# Patient Record
Sex: MALE | Race: WHITE | NOT HISPANIC OR LATINO | Employment: FULL TIME | URBAN - METROPOLITAN AREA
[De-identification: names, ages, dates, MRNs, and addresses within clinical notes are randomized per-mention and may not be internally consistent; named-entity substitution may affect disease eponyms.]

---

## 2019-07-29 ENCOUNTER — TELEPHONE (OUTPATIENT)
Dept: ORTHOPEDICS | Facility: CLINIC | Age: 59
End: 2019-07-29

## 2019-07-29 NOTE — TELEPHONE ENCOUNTER
----- Message from Carlota Olmos sent at 7/26/2019  8:09 AM CDT -----  Contact: Leigh (spouse)  Leigh (spouse) called asking for you to call her back she has some questions    [phone]

## 2019-07-29 NOTE — TELEPHONE ENCOUNTER
7/26/19  Spoke w/ patient's wife.   Patient wants to continue to see Dr. Tran.  Will make appointment.

## 2019-08-01 RX ORDER — FLUTICASONE PROPIONATE 50 MCG
SPRAY, SUSPENSION (ML) NASAL
COMMUNITY
End: 2019-08-16

## 2019-08-01 RX ORDER — FLUORIDE (SODIUM) 1.1 %
PASTE (ML) DENTAL
COMMUNITY
End: 2019-08-16

## 2019-08-01 RX ORDER — CLOBETASOL PROPIONATE 0.5 MG/G
OINTMENT TOPICAL
COMMUNITY
End: 2019-08-16

## 2019-08-01 RX ORDER — MELOXICAM 7.5 MG/1
TABLET ORAL
COMMUNITY
End: 2019-08-16

## 2019-08-01 RX ORDER — MELOXICAM 15 MG/1
15 TABLET ORAL DAILY
Refills: 5 | COMMUNITY
Start: 2019-06-24

## 2019-08-01 RX ORDER — AMOXICILLIN 875 MG/1
TABLET, FILM COATED ORAL
COMMUNITY
End: 2019-08-16

## 2019-08-02 ENCOUNTER — OFFICE VISIT (OUTPATIENT)
Dept: ORTHOPEDICS | Facility: CLINIC | Age: 59
End: 2019-08-02
Payer: COMMERCIAL

## 2019-08-02 VITALS — TEMPERATURE: 99 F | BODY MASS INDEX: 34.02 KG/M2 | HEIGHT: 69 IN | WEIGHT: 229.69 LBS

## 2019-08-02 DIAGNOSIS — M17.11 PRIMARY OSTEOARTHRITIS OF RIGHT KNEE: Primary | ICD-10-CM

## 2019-08-02 PROCEDURE — 3008F BODY MASS INDEX DOCD: CPT | Mod: CPTII,S$GLB,, | Performed by: ORTHOPAEDIC SURGERY

## 2019-08-02 PROCEDURE — 3008F PR BODY MASS INDEX (BMI) DOCUMENTED: ICD-10-PCS | Mod: CPTII,S$GLB,, | Performed by: ORTHOPAEDIC SURGERY

## 2019-08-02 PROCEDURE — 73560 X-RAY EXAM OF KNEE 1 OR 2: CPT | Mod: TC,RT,S$GLB, | Performed by: ORTHOPAEDIC SURGERY

## 2019-08-02 PROCEDURE — 99212 OFFICE O/P EST SF 10 MIN: CPT | Mod: S$GLB,,, | Performed by: ORTHOPAEDIC SURGERY

## 2019-08-02 PROCEDURE — 99212 PR OFFICE/OUTPT VISIT, EST, LEVL II, 10-19 MIN: ICD-10-PCS | Mod: S$GLB,,, | Performed by: ORTHOPAEDIC SURGERY

## 2019-08-02 PROCEDURE — 73560 PR  X-RAY KNEE 1 OR 2 VIEW: ICD-10-PCS | Mod: TC,RT,S$GLB, | Performed by: ORTHOPAEDIC SURGERY

## 2019-08-02 NOTE — PROGRESS NOTES
Subjective:      Patient ID: Saurabh Ann is a 59 y.o. male.    Chief Complaint: Pain of the Right Knee; Knee Pain (RT); and Arthritis    HPI 59-year-old man with a several year history of right knee pain.  He has been on meloxicam with some relief.  The pain is exacerbated by prolonged standing or squatting.    Review of Systems   Constitution: Negative for fever and weight loss.   Cardiovascular: Negative for chest pain and leg swelling.   Musculoskeletal: Positive for arthritis, joint pain, joint swelling and stiffness. Negative for muscle weakness.   Gastrointestinal: Negative for change in bowel habit.   Genitourinary: Negative for bladder incontinence and hematuria.   Neurological: Negative for focal weakness, numbness, paresthesias and sensory change.         Objective:                General Musculoskeletal Exam   Gait: normal       Right Knee Exam     Inspection   Scars: absent  Swelling: absent  Bruising: absent    Tenderness   The patient is tender to palpation of the condyle, medial joint line and lateral joint line.    Crepitus   The patient has crepitus of the patella.    Range of Motion   Extension: normal   Flexion: normal     Tests   Meniscus   Juan Daniel:  Medial - negative Lateral - negative  Ligament Examination Lachman: normal (-1 to 2mm)   MCL - Valgus: normal (0 to 2mm)  LCL - Varus: normal  Patella   Patellar Tracking: normal    Muscle Strength   Right Lower Extremity   Quadriceps:  5/5   Hamstrin/5               Assessment:       Encounter Diagnosis   Name Primary?    Primary osteoarthritis of right knee Yes          Plan:       Saurabh was seen today for knee pain, arthritis and pain.    Diagnoses and all orders for this visit:    Primary osteoarthritis of right knee      X-ray of the right knee shows tricompartmental disease with near complete loss of the medial articular cartilage space. He has subchondral sclerosis in both the medial and lateral compartment.  He has significant  sclerosis at the patellofemoral joint    He is ready to proceed with total knee arthroplasty.  We will do this in the near future pending medical clearance.  Complications alternatives wrists indications and benefits are discussed with the patient and his spouse.  He is given literature on total knee arthroplasty

## 2019-08-05 ENCOUNTER — TELEPHONE (OUTPATIENT)
Dept: ORTHOPEDICS | Facility: CLINIC | Age: 59
End: 2019-08-05

## 2019-08-05 NOTE — TELEPHONE ENCOUNTER
----- Message from Marisel Hart sent at 8/5/2019  8:20 AM CDT -----  Fax for Dr. Reynaldo Valdovinos Fax#  [phone] Attn: Rachel

## 2019-08-09 ENCOUNTER — HISTORICAL (OUTPATIENT)
Dept: ADMINISTRATIVE | Facility: HOSPITAL | Age: 59
End: 2019-08-09

## 2019-08-15 ENCOUNTER — TELEPHONE (OUTPATIENT)
Dept: ORTHOPEDICS | Facility: CLINIC | Age: 59
End: 2019-08-15

## 2019-08-15 NOTE — TELEPHONE ENCOUNTER
----- Message from Tatyana Yi sent at 8/15/2019  9:30 AM CDT -----  Contact: WIFE JIMMY   PT HAVING SURGERY ON THE 21ST ....WIFE SAID WAS TOLD TO COME IN Monday AT  1 .....AND THEN GO TO HOSPITAL AND SOMETHING ABOUT BLOOD WORK .  HAS SOME QUESTIONS FOR NURSE.  PLEASE CALL BACK         [phone]  JIMMYUNIQUE CUNNINGHAM

## 2019-08-15 NOTE — TELEPHONE ENCOUNTER
8/15/19  Spoke w/ patient's wife.  He has appointment on 08/19/19  At 9:30am to sign consent form then will go to hospital to pre-register.

## 2019-08-19 ENCOUNTER — OFFICE VISIT (OUTPATIENT)
Dept: ORTHOPEDICS | Facility: CLINIC | Age: 59
End: 2019-08-19
Payer: COMMERCIAL

## 2019-08-19 DIAGNOSIS — M17.11 PRIMARY OSTEOARTHRITIS OF RIGHT KNEE: Primary | ICD-10-CM

## 2019-08-19 PROCEDURE — 99499 NO LOS: ICD-10-PCS | Mod: S$GLB,,, | Performed by: ORTHOPAEDIC SURGERY

## 2019-08-19 PROCEDURE — 99499 UNLISTED E&M SERVICE: CPT | Mod: S$GLB,,, | Performed by: ORTHOPAEDIC SURGERY

## 2019-08-19 NOTE — PROGRESS NOTES
Subjective:      Patient ID: Saurabh Ann is a 59 y.o. male.    Chief Complaint: Pain of the Right Knee (Sign surgery consent)    HPI patient is here to sign his consents for right total knee arthroplasty    ROS    unchanged from prior visit  Objective:            Ortho/SPM Exam  Unchanged from prior visit          Assessment:       Encounter Diagnosis   Name Primary?    Primary osteoarthritis of right knee Yes          Plan:       Saurabh was seen today for pain.    Diagnoses and all orders for this visit:    Primary osteoarthritis of right knee     Complications alternatives wrists indications benefits and expectations are discussed with the patient and he agrees to proceed

## 2019-08-21 ENCOUNTER — OUTSIDE PLACE OF SERVICE (OUTPATIENT)
Dept: ORTHOPEDICS | Facility: CLINIC | Age: 59
End: 2019-08-21
Payer: COMMERCIAL

## 2019-08-21 PROCEDURE — 27447 TOTAL KNEE ARTHROPLASTY: CPT | Mod: RT,,, | Performed by: ORTHOPAEDIC SURGERY

## 2019-08-21 PROCEDURE — 27447 PR TOTAL KNEE ARTHROPLASTY: ICD-10-PCS | Mod: RT,,, | Performed by: ORTHOPAEDIC SURGERY

## 2019-08-22 LAB
CREAT SERPL-MCNC: 0.79 MG/DL (ref 0.7–1.3)
GFR ESTIMATION: > 60
HCT VFR BLD AUTO: 32.5 % (ref 42–52)
HGB BLD-MCNC: 10.9 G/DL (ref 14–18)
PLATELETS: 184 10*3/UL (ref 142–424)

## 2019-08-23 LAB
CREAT SERPL-MCNC: 0.86 MG/DL (ref 0.7–1.3)
GFR ESTIMATION: > 60
HCT VFR BLD AUTO: 26.2 % (ref 42–52)
HGB BLD-MCNC: 8.7 G/DL (ref 14–18)

## 2019-08-28 ENCOUNTER — TELEPHONE (OUTPATIENT)
Dept: ORTHOPEDICS | Facility: CLINIC | Age: 59
End: 2019-08-28

## 2019-08-28 NOTE — TELEPHONE ENCOUNTER
----- Message from Marisel Hart sent at 8/28/2019  8:24 AM CDT -----  Has questions about his pain level

## 2019-08-28 NOTE — TELEPHONE ENCOUNTER
8/28/19  11:05am   Spoke w/ patient.  One week post op.  Still having significant pain.   No calf pain, no SOB, no chest pain.    Informed him that this is normal - At 2 week mingo his symptoms shoud start to get better & pain should ease off.    Has appointment on 8/30/19.  Will reevaluate then.

## 2019-08-29 RX ORDER — ONDANSETRON 4 MG/1
4 TABLET, FILM COATED ORAL EVERY 6 HOURS PRN
Refills: 0 | COMMUNITY
Start: 2019-08-23

## 2019-08-29 RX ORDER — OXYCODONE AND ACETAMINOPHEN 7.5; 325 MG/1; MG/1
1 TABLET ORAL 3 TIMES DAILY PRN
Refills: 0 | COMMUNITY
Start: 2019-08-23 | End: 2019-09-11

## 2019-08-30 ENCOUNTER — OFFICE VISIT (OUTPATIENT)
Dept: ORTHOPEDICS | Facility: CLINIC | Age: 59
End: 2019-08-30
Payer: COMMERCIAL

## 2019-08-30 DIAGNOSIS — M17.11 PRIMARY OSTEOARTHRITIS OF RIGHT KNEE: Primary | ICD-10-CM

## 2019-08-30 PROCEDURE — 99024 POSTOP FOLLOW-UP VISIT: CPT | Mod: S$GLB,,, | Performed by: ORTHOPAEDIC SURGERY

## 2019-08-30 PROCEDURE — 73560 X-RAY EXAM OF KNEE 1 OR 2: CPT | Mod: TC,RT,S$GLB, | Performed by: ORTHOPAEDIC SURGERY

## 2019-08-30 PROCEDURE — 99024 PR POST-OP FOLLOW-UP VISIT: ICD-10-PCS | Mod: S$GLB,,, | Performed by: ORTHOPAEDIC SURGERY

## 2019-08-30 PROCEDURE — 73560 PR  X-RAY KNEE 1 OR 2 VIEW: ICD-10-PCS | Mod: TC,RT,S$GLB, | Performed by: ORTHOPAEDIC SURGERY

## 2019-08-30 NOTE — PROGRESS NOTES
Subjective:      Patient ID: Saurabh Ann is a 59 y.o. male.    Chief Complaint: Post-op Evaluation    HPI  Patient is a days postop right total knee arthroplasty.  He is walking with the aid of a walker.  He complains of moderate discomfort  ROS    unchanged from prior visit  Objective:        The incision is clean.  He has moderate swelling.  There is no drainage or increased heat.  Range of motion is from -5 to 8 5°    Ortho/SPM Exam            Assessment:       Encounter Diagnosis   Name Primary?    Primary osteoarthritis of right knee Yes          Plan:       Saurabh was seen today for post-op evaluation.    Diagnoses and all orders for this visit:    Primary osteoarthritis of right knee     X-rays show no evidence of complication.

## 2019-09-06 ENCOUNTER — OFFICE VISIT (OUTPATIENT)
Dept: ORTHOPEDICS | Facility: CLINIC | Age: 59
End: 2019-09-06
Payer: COMMERCIAL

## 2019-09-06 DIAGNOSIS — M17.11 PRIMARY OSTEOARTHRITIS OF RIGHT KNEE: Primary | ICD-10-CM

## 2019-09-06 PROCEDURE — 99024 POSTOP FOLLOW-UP VISIT: CPT | Mod: S$GLB,,, | Performed by: ORTHOPAEDIC SURGERY

## 2019-09-06 PROCEDURE — 99024 PR POST-OP FOLLOW-UP VISIT: ICD-10-PCS | Mod: S$GLB,,, | Performed by: ORTHOPAEDIC SURGERY

## 2019-09-06 NOTE — PROGRESS NOTES
Subjective:      Patient ID: Saurabh Ann is a 59 y.o. male.    Chief Complaint: Post-op Evaluation    HPI patient is 2 weeks postop right total knee arthroplasty.  He is doing well is walking with the aid of a cane.    ROS unchanged from prior visit      Objective:      He has range of motion from -3 to 9 5°.  He has moderate swelling. There is no drainage.      Ortho/SPM Exam            Assessment:       Encounter Diagnosis   Name Primary?    Primary osteoarthritis of right knee Yes          Plan:       Saurabh was seen today for post-op evaluation.    Diagnoses and all orders for this visit:    Primary osteoarthritis of right knee      Staples are removed today.  Return 2 weeks for a motion check

## 2019-09-11 RX ORDER — OXYCODONE AND ACETAMINOPHEN 5; 325 MG/1; MG/1
1 TABLET ORAL EVERY 8 HOURS PRN
Qty: 21 TABLET | Refills: 0 | Status: SHIPPED | OUTPATIENT
Start: 2019-09-11

## 2019-09-19 RX ORDER — OXYCODONE AND ACETAMINOPHEN 10; 325 MG/1; MG/1
1 TABLET ORAL 3 TIMES DAILY PRN
Refills: 0 | COMMUNITY
Start: 2019-08-30

## 2019-09-20 ENCOUNTER — OFFICE VISIT (OUTPATIENT)
Dept: ORTHOPEDICS | Facility: CLINIC | Age: 59
End: 2019-09-20
Payer: COMMERCIAL

## 2019-09-20 DIAGNOSIS — M17.11 PRIMARY OSTEOARTHRITIS OF RIGHT KNEE: Primary | ICD-10-CM

## 2019-09-20 PROCEDURE — 99024 PR POST-OP FOLLOW-UP VISIT: ICD-10-PCS | Mod: S$GLB,,, | Performed by: ORTHOPAEDIC SURGERY

## 2019-09-20 PROCEDURE — 99024 POSTOP FOLLOW-UP VISIT: CPT | Mod: S$GLB,,, | Performed by: ORTHOPAEDIC SURGERY

## 2019-09-20 NOTE — PROGRESS NOTES
Subjective:      Patient ID: Saurabh Ann is a 59 y.o. male.    Chief Complaint: Post-op Evaluation of the Right Knee    HPI patient is here for postop evaluation 1 month out from his right total knee arthroplasty.  He still complains of mild to moderate discomfort.  He is attending outpatient physical therapy    ROS unchanged from prior visit      Objective:      Examination of the right knee shows mild swelling. He has normal alignment. The incision is well healed.  He has range of motion from -2 to 110°.      Ortho/SPM Exam            Assessment:       Encounter Diagnosis   Name Primary?    Primary osteoarthritis of right knee Yes          Plan:       Saurabh was seen today for post-op evaluation.    Diagnoses and all orders for this visit:    Primary osteoarthritis of right knee     Recommend continue PT.  He will be seen back in 6 weeks for recheck

## 2019-11-01 ENCOUNTER — OFFICE VISIT (OUTPATIENT)
Dept: ORTHOPEDICS | Facility: CLINIC | Age: 59
End: 2019-11-01
Payer: COMMERCIAL

## 2019-11-01 DIAGNOSIS — M70.50 PES ANSERINE BURSITIS: ICD-10-CM

## 2019-11-01 DIAGNOSIS — M17.11 PRIMARY OSTEOARTHRITIS OF RIGHT KNEE: Primary | ICD-10-CM

## 2019-11-01 PROCEDURE — 99024 PR POST-OP FOLLOW-UP VISIT: ICD-10-PCS | Mod: S$GLB,,, | Performed by: ORTHOPAEDIC SURGERY

## 2019-11-01 PROCEDURE — 20610 LARGE JOINT ASPIRATION/INJECTION: R KNEE: ICD-10-PCS | Mod: 58,RT,S$GLB, | Performed by: ORTHOPAEDIC SURGERY

## 2019-11-01 PROCEDURE — 20610 DRAIN/INJ JOINT/BURSA W/O US: CPT | Mod: 58,RT,S$GLB, | Performed by: ORTHOPAEDIC SURGERY

## 2019-11-01 PROCEDURE — 99024 POSTOP FOLLOW-UP VISIT: CPT | Mod: S$GLB,,, | Performed by: ORTHOPAEDIC SURGERY

## 2019-11-01 NOTE — PROCEDURES
Large Joint Aspiration/Injection: R knee  Date/Time: 11/1/2019 9:45 AM  Performed by: Dhaval Tran MD  Authorized by: Dhaval Tran MD     Consent Done?:  Yes (Verbal)  Indications:  Pain  Timeout: Prior to procedure the correct patient, procedure, and site was verified    Anesthesia  Local anesthesia not used      Location:  Knee  Site:  R knee (Pes anserine bursa)  Prep: Patient was prepped and draped in usual sterile fashion    Needle size:  25 G  Approach:  Anteromedial  Medications:  10 mg triamcinolone acetonide 10 mg/mL  Patient tolerance:  Patient tolerated the procedure well with no immediate complications

## 2019-11-01 NOTE — PROGRESS NOTES
Subjective:      Patient ID: Saurabh Ann is a 59 y.o. male.    Chief Complaint: Pain of the Right Knee    HPI patient is 8 weeks postop right total knee arthroplasty.  He still complains of pain on a level of 3-4 over 10.  He still has occasional moderate swelling as well    ROS unchanged from prior visit      Objective:      The incision is well healed.  He has range of motion from -2 to 120°.  He has tenderness and swelling over the pes anserine bursa  Ortho/SPM Exam            Assessment:       Encounter Diagnosis   Name Primary?    Primary osteoarthritis of right knee Yes          Plan:       Saurabh was seen today for pain.    Diagnoses and all orders for this visit:    Primary osteoarthritis of right knee     The pes anserine bursa is injected with Kenalog.  He is to ice the area. Return 3 months for recheck.  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

## 2019-11-22 ENCOUNTER — OFFICE VISIT (OUTPATIENT)
Dept: ORTHOPEDICS | Facility: CLINIC | Age: 59
End: 2019-11-22
Payer: COMMERCIAL

## 2019-11-22 DIAGNOSIS — M17.11 PRIMARY OSTEOARTHRITIS OF RIGHT KNEE: Primary | ICD-10-CM

## 2019-11-22 PROCEDURE — 99024 POSTOP FOLLOW-UP VISIT: CPT | Mod: S$GLB,,, | Performed by: ORTHOPAEDIC SURGERY

## 2019-11-22 PROCEDURE — 99024 PR POST-OP FOLLOW-UP VISIT: ICD-10-PCS | Mod: S$GLB,,, | Performed by: ORTHOPAEDIC SURGERY

## 2019-11-22 NOTE — PROGRESS NOTES
Subjective:      Patient ID: Saurabh Ann is a 59 y.o. male.    Chief Complaint: Post-op Evaluation of the Right Knee    HPI patient is here for follow-up status post right total knee arthroplasty.  He is doing well.  He does notice a clunk in the knee with range of motion    ROS unchanged from prior visit      Objective:      Incision is well healed.  He has range of motion from -2 to 130°.      Ortho/SPM Exam            Assessment:       Encounter Diagnosis   Name Primary?    Primary osteoarthritis of right knee Yes          Plan:       Saurabh was seen today for post-op evaluation.    Diagnoses and all orders for this visit:    Primary osteoarthritis of right knee     He is to continue with a home exercise program.  Return 3 months for routine follow-up

## 2020-02-07 ENCOUNTER — OFFICE VISIT (OUTPATIENT)
Dept: ORTHOPEDICS | Facility: CLINIC | Age: 60
End: 2020-02-07
Payer: COMMERCIAL

## 2020-02-07 VITALS — TEMPERATURE: 98 F | BODY MASS INDEX: 34.16 KG/M2 | WEIGHT: 238.63 LBS | HEIGHT: 70 IN

## 2020-02-07 DIAGNOSIS — M17.11 PRIMARY OSTEOARTHRITIS OF RIGHT KNEE: Primary | ICD-10-CM

## 2020-02-07 PROCEDURE — 3008F PR BODY MASS INDEX (BMI) DOCUMENTED: ICD-10-PCS | Mod: CPTII,S$GLB,, | Performed by: ORTHOPAEDIC SURGERY

## 2020-02-07 PROCEDURE — 99212 PR OFFICE/OUTPT VISIT, EST, LEVL II, 10-19 MIN: ICD-10-PCS | Mod: 25,S$GLB,, | Performed by: ORTHOPAEDIC SURGERY

## 2020-02-07 PROCEDURE — 3008F BODY MASS INDEX DOCD: CPT | Mod: CPTII,S$GLB,, | Performed by: ORTHOPAEDIC SURGERY

## 2020-02-07 PROCEDURE — 99212 OFFICE O/P EST SF 10 MIN: CPT | Mod: 25,S$GLB,, | Performed by: ORTHOPAEDIC SURGERY

## 2020-02-07 RX ORDER — ASCORBIC ACID 125 MG
TABLET,CHEWABLE ORAL
COMMUNITY

## 2020-02-07 NOTE — PROGRESS NOTES
Subjective:      Patient ID: Saurabh Ann is a 59 y.o. male.    Chief Complaint: Post-op Evaluation of the Right Knee    HPI patient is 6 months postop right total knee arthroplasty.  He has some mild discomfort which is related to how much he stands and walks at work.    Review of Systems   Constitution: Negative for fever and weight loss.   Cardiovascular: Negative for chest pain and leg swelling.   Musculoskeletal: Positive for arthritis. Negative for joint pain, joint swelling, muscle weakness and stiffness.   Gastrointestinal: Negative for change in bowel habit.   Genitourinary: Negative for bladder incontinence and hematuria.   Neurological: Negative for focal weakness, numbness, paresthesias and sensory change.         Objective:      Range of motion of the right knee is from 0-130 degrees. He has normal alignment on standing.  There is no effusion.  There is no increased redness or heat      Ortho/SPM Exam            Assessment:       Encounter Diagnosis   Name Primary?    Primary osteoarthritis of right knee Yes          Plan:       Saurabh was seen today for post-op evaluation.    Diagnoses and all orders for this visit:    Primary osteoarthritis of right knee  -     X-Ray Knee 1 or 2 View Right; Future      X-rays show good alignment of the prosthesis without evidence of loosening

## 2021-01-25 ENCOUNTER — TELEPHONE (OUTPATIENT)
Dept: ORTHOPEDICS | Facility: CLINIC | Age: 61
End: 2021-01-25

## 2021-01-25 ENCOUNTER — OFFICE VISIT (OUTPATIENT)
Dept: ORTHOPEDICS | Facility: CLINIC | Age: 61
End: 2021-01-25
Payer: COMMERCIAL

## 2021-01-25 VITALS — TEMPERATURE: 99 F | WEIGHT: 254.81 LBS | BODY MASS INDEX: 37.74 KG/M2 | HEIGHT: 69 IN

## 2021-01-25 DIAGNOSIS — M79.642 LEFT HAND PAIN: Primary | ICD-10-CM

## 2021-01-25 DIAGNOSIS — M77.8 TENDINITIS OF FLEXOR TENDON OF LEFT HAND: ICD-10-CM

## 2021-01-25 PROCEDURE — 3008F PR BODY MASS INDEX (BMI) DOCUMENTED: ICD-10-PCS | Mod: CPTII,S$GLB,, | Performed by: ORTHOPAEDIC SURGERY

## 2021-01-25 PROCEDURE — 99213 PR OFFICE/OUTPT VISIT, EST, LEVL III, 20-29 MIN: ICD-10-PCS | Mod: S$GLB,,, | Performed by: ORTHOPAEDIC SURGERY

## 2021-01-25 PROCEDURE — 3008F BODY MASS INDEX DOCD: CPT | Mod: CPTII,S$GLB,, | Performed by: ORTHOPAEDIC SURGERY

## 2021-01-25 PROCEDURE — 99213 OFFICE O/P EST LOW 20 MIN: CPT | Mod: S$GLB,,, | Performed by: ORTHOPAEDIC SURGERY

## 2021-02-08 DIAGNOSIS — M79.642 PAIN OF LEFT HAND: ICD-10-CM

## 2021-02-09 ENCOUNTER — TELEPHONE (OUTPATIENT)
Dept: ORTHOPEDICS | Facility: CLINIC | Age: 61
End: 2021-02-09

## 2021-02-09 DIAGNOSIS — M77.8 TENDINITIS OF FLEXOR TENDON OF LEFT HAND: Primary | ICD-10-CM

## 2021-03-01 ENCOUNTER — OFFICE VISIT (OUTPATIENT)
Dept: ORTHOPEDICS | Facility: CLINIC | Age: 61
End: 2021-03-01
Payer: COMMERCIAL

## 2021-03-01 DIAGNOSIS — M77.8 TENDINITIS OF EXTENSOR TENDON OF LEFT HAND: Primary | ICD-10-CM

## 2021-03-01 PROCEDURE — 99212 OFFICE O/P EST SF 10 MIN: CPT | Mod: S$GLB,,, | Performed by: ORTHOPAEDIC SURGERY

## 2021-03-01 PROCEDURE — 99212 PR OFFICE/OUTPT VISIT, EST, LEVL II, 10-19 MIN: ICD-10-PCS | Mod: S$GLB,,, | Performed by: ORTHOPAEDIC SURGERY

## 2021-03-01 RX ORDER — DICLOFENAC SODIUM 75 MG/1
75 TABLET, DELAYED RELEASE ORAL 2 TIMES DAILY
COMMUNITY
Start: 2021-02-18 | End: 2021-05-24

## 2021-03-19 ENCOUNTER — HISTORICAL (OUTPATIENT)
Dept: ADMINISTRATIVE | Facility: HOSPITAL | Age: 61
End: 2021-03-19

## 2021-05-20 ENCOUNTER — TELEPHONE (OUTPATIENT)
Dept: ORTHOPEDICS | Facility: CLINIC | Age: 61
End: 2021-05-20

## 2022-03-28 ENCOUNTER — OFFICE VISIT (OUTPATIENT)
Dept: ORTHOPEDICS | Facility: CLINIC | Age: 62
End: 2022-03-28
Payer: COMMERCIAL

## 2022-03-28 VITALS — WEIGHT: 242 LBS | HEIGHT: 70 IN | BODY MASS INDEX: 34.65 KG/M2

## 2022-03-28 DIAGNOSIS — M25.512 CHRONIC PAIN OF BOTH SHOULDERS: Primary | ICD-10-CM

## 2022-03-28 DIAGNOSIS — M25.511 CHRONIC PAIN OF BOTH SHOULDERS: Primary | ICD-10-CM

## 2022-03-28 DIAGNOSIS — G89.29 CHRONIC PAIN OF BOTH SHOULDERS: Primary | ICD-10-CM

## 2022-03-28 PROCEDURE — 99213 OFFICE O/P EST LOW 20 MIN: CPT | Mod: 25,S$GLB,, | Performed by: ORTHOPAEDIC SURGERY

## 2022-03-28 PROCEDURE — 1160F PR REVIEW ALL MEDS BY PRESCRIBER/CLIN PHARMACIST DOCUMENTED: ICD-10-PCS | Mod: CPTII,S$GLB,, | Performed by: ORTHOPAEDIC SURGERY

## 2022-03-28 PROCEDURE — 1159F MED LIST DOCD IN RCRD: CPT | Mod: CPTII,S$GLB,, | Performed by: ORTHOPAEDIC SURGERY

## 2022-03-28 PROCEDURE — 20610 LARGE JOINT ASPIRATION/INJECTION: BILATERAL SUBACROMIAL BURSA: ICD-10-PCS | Mod: 50,S$GLB,, | Performed by: ORTHOPAEDIC SURGERY

## 2022-03-28 PROCEDURE — 1160F RVW MEDS BY RX/DR IN RCRD: CPT | Mod: CPTII,S$GLB,, | Performed by: ORTHOPAEDIC SURGERY

## 2022-03-28 PROCEDURE — 3008F PR BODY MASS INDEX (BMI) DOCUMENTED: ICD-10-PCS | Mod: CPTII,S$GLB,, | Performed by: ORTHOPAEDIC SURGERY

## 2022-03-28 PROCEDURE — 1159F PR MEDICATION LIST DOCUMENTED IN MEDICAL RECORD: ICD-10-PCS | Mod: CPTII,S$GLB,, | Performed by: ORTHOPAEDIC SURGERY

## 2022-03-28 PROCEDURE — 99213 PR OFFICE/OUTPT VISIT, EST, LEVL III, 20-29 MIN: ICD-10-PCS | Mod: 25,S$GLB,, | Performed by: ORTHOPAEDIC SURGERY

## 2022-03-28 PROCEDURE — 3008F BODY MASS INDEX DOCD: CPT | Mod: CPTII,S$GLB,, | Performed by: ORTHOPAEDIC SURGERY

## 2022-03-28 PROCEDURE — 20610 DRAIN/INJ JOINT/BURSA W/O US: CPT | Mod: 50,S$GLB,, | Performed by: ORTHOPAEDIC SURGERY

## 2022-03-28 RX ORDER — ETANERCEPT 50 MG/ML
SOLUTION SUBCUTANEOUS
COMMUNITY
Start: 2022-03-22

## 2022-03-28 RX ORDER — HYDROXYCHLOROQUINE SULFATE 200 MG/1
200 TABLET, FILM COATED ORAL 2 TIMES DAILY
COMMUNITY
Start: 2022-03-20

## 2022-03-28 RX ORDER — TRIAMCINOLONE ACETONIDE 40 MG/ML
20 INJECTION, SUSPENSION INTRA-ARTICULAR; INTRAMUSCULAR
Status: DISCONTINUED | OUTPATIENT
Start: 2022-03-28 | End: 2022-03-28 | Stop reason: HOSPADM

## 2022-03-28 RX ADMIN — TRIAMCINOLONE ACETONIDE 20 MG: 40 INJECTION, SUSPENSION INTRA-ARTICULAR; INTRAMUSCULAR at 09:03

## 2022-03-28 NOTE — PROGRESS NOTES
Subjective:      Patient ID: Saurabh Ann is a 62 y.o. male.    Chief Complaint: Pain of the Left Shoulder and Pain of the Right Shoulder    HPI 62-year-old man comes in with bilateral shoulder pain worse on the right than the left.  He denies any history of trauma.  He is unable to sleep on either side at night  Review of Systems   Constitutional: Negative for fever and weight loss.   Cardiovascular: Negative for chest pain and leg swelling.   Musculoskeletal: Positive for joint pain, muscle weakness and stiffness. Negative for arthritis and joint swelling.   Gastrointestinal: Negative for change in bowel habit.   Genitourinary: Negative for bladder incontinence and hematuria.   Neurological: Negative for focal weakness, numbness, paresthesias and sensory change.         Objective:      Shoulder Musculoskeletal Exam     Inspection      Inspection - Right        Atrophy: none      Symmetry: symmetric      Inspection - Left        Atrophy: none      Symmetry: symmetric    Palpation      Palpation - Right        Crepitus: no crepitus      Tenderness: present        Rotator cuff: moderate        Greater tuberosity: moderate        Lateral arm: moderate      Palpation - Left        Crepitus: no crepitus      Tenderness: present        Rotator cuff: mild        Greater tuberosity: mild    Range of Motion      Range of Motion - Right        Active forward elevation: 80      Passive forward elevation: 100      Shoulder active abduction: 90      Passive abduction: 110      Range of Motion - Left        Active forward elevation: 120      Passive forward elevation: 140      Shoulder active abduction: 140      Passive abduction: 160    Strength      Strength - Right        External rotation: 4+/5      External rotation: affected by pain      Internal rotation: 4/5      Abduction: 4/5      Abduction: affected by pain      Strength - Left        Left shoulder strength is normal.    Neurovascular      Neurovascular - Right         Right shoulder nerve sensation is normal.      Neurovascular - Left        Left shoulder nerve sensation is normal.    Special Tests      Special Tests - Right        Rotator Cuff Signs - Right        Santana test: positive      Painful arc test: positive      Drop arm test: negative      Special Tests - Left          Rotator Cuff Signs - Left        Santana test: positive                 Right Shoulder Exam     Range of Motion   Active abduction: 90   Passive abduction: 110     Tests & Signs   Drop arm: negative  Santana test: positive    Left Shoulder Exam     Range of Motion   Active abduction: 140   Passive abduction: 160     Muscle Strength   The patient has normal left shoulder strength.    Tests & Signs   Santana test: positive      Muscle Strength   Right Upper Extremity   Shoulder Abduction: 4   Shoulder Internal Rotation: 4   Shoulder External Rotation: 4+               Assessment:       Encounter Diagnosis   Name Primary?    Chronic pain of both shoulders Yes          Plan:       Saurabh was seen today for pain and pain.    Diagnoses and all orders for this visit:    Chronic pain of both shoulders  -     X-ray Shoulder 2 or More Views Right; Future  -     X-Ray Shoulder 2 or More Views Left; Future  -     X-Ray Shoulder 2 or More Views Left      X-ray of both shoulders shows degenerative changes at the AC joint.  The humeral head is slightly high riding bilaterally.    Impression is rotator cuff tendinitis versus tear.  The shoulder is injected today.  Arrangements were made for a course of physical therapy.  Return 3 weeks for recheck.  If his symptoms worsen or persist he will need MRI of both shoulders

## 2022-03-28 NOTE — PROCEDURES
Large Joint Aspiration/Injection: bilateral subacromial bursa    Date/Time: 3/28/2022 9:30 AM  Performed by: Dhaval Tran MD  Authorized by: Dhaval Tran MD     Consent Done?:  Yes (Verbal)  Prep: patient was prepped and draped in usual sterile fashion      Local anesthesia used?: Yes    Local anesthetic:  Lidocaine 2% without epinephrine  Anesthetic total (ml):  4      Details:  Needle Size:  22 G  Approach:  Posterior  Location:  Shoulder  Laterality:  Bilateral  Site:  Bilateral subacromial bursa  Medications (Right):  20 mg triamcinolone acetonide 40 mg/mL  Medications (Left):  20 mg triamcinolone acetonide 40 mg/mL  Patient tolerance:  Patient tolerated the procedure well with no immediate complications

## 2022-08-29 ENCOUNTER — OFFICE VISIT (OUTPATIENT)
Dept: ORTHOPEDICS | Facility: CLINIC | Age: 62
End: 2022-08-29
Payer: COMMERCIAL

## 2022-08-29 DIAGNOSIS — M17.11 PRIMARY OSTEOARTHRITIS OF RIGHT KNEE: Primary | ICD-10-CM

## 2022-08-29 PROCEDURE — 99212 OFFICE O/P EST SF 10 MIN: CPT | Mod: S$GLB,,, | Performed by: ORTHOPAEDIC SURGERY

## 2022-08-29 PROCEDURE — 1159F MED LIST DOCD IN RCRD: CPT | Mod: CPTII,S$GLB,, | Performed by: ORTHOPAEDIC SURGERY

## 2022-08-29 PROCEDURE — 1159F PR MEDICATION LIST DOCUMENTED IN MEDICAL RECORD: ICD-10-PCS | Mod: CPTII,S$GLB,, | Performed by: ORTHOPAEDIC SURGERY

## 2022-08-29 PROCEDURE — 1160F RVW MEDS BY RX/DR IN RCRD: CPT | Mod: CPTII,S$GLB,, | Performed by: ORTHOPAEDIC SURGERY

## 2022-08-29 PROCEDURE — 99212 PR OFFICE/OUTPT VISIT, EST, LEVL II, 10-19 MIN: ICD-10-PCS | Mod: S$GLB,,, | Performed by: ORTHOPAEDIC SURGERY

## 2022-08-29 PROCEDURE — 1160F PR REVIEW ALL MEDS BY PRESCRIBER/CLIN PHARMACIST DOCUMENTED: ICD-10-PCS | Mod: CPTII,S$GLB,, | Performed by: ORTHOPAEDIC SURGERY

## 2022-08-29 NOTE — PROGRESS NOTES
Subjective:      Patient ID: Saurabh Ann is a 62 y.o. male.    Chief Complaint: Pain of the Right Knee    HPI 62-year-old man status post right total knee arthroplasty comes in with intermittent episodes of popping in the knee.  Then has a short period of discomfort.    ROS noncontributory      Objective:      Active and passive range of motion of the knee is normal.  There is no pathologic laxity.  He has no effusion.      Ortho/SPM Exam            Assessment:       Encounter Diagnosis   Name Primary?    Primary osteoarthritis of right knee Yes          Plan:       Saurabh was seen today for pain.    Diagnoses and all orders for this visit:    Primary osteoarthritis of right knee  -     X-Ray Knee 1 or 2 View Right; Future    X-rays show a less than 1 mm lucency beneath the medial tibial tray.      He is placed in a course of therapy to strengthen his quadriceps.  Return p.r.n.

## 2024-05-06 DIAGNOSIS — M06.09 RHEUMATOID ARTHRITIS OF MULTIPLE SITES WITHOUT RHEUMATOID FACTOR: Primary | ICD-10-CM

## 2024-05-06 RX ORDER — DIPHENHYDRAMINE HYDROCHLORIDE 50 MG/ML
12.5 INJECTION INTRAMUSCULAR; INTRAVENOUS ONCE AS NEEDED
Status: CANCELLED | OUTPATIENT
Start: 2024-05-26

## 2024-05-06 RX ORDER — ACETAMINOPHEN 500 MG
500 TABLET ORAL ONCE AS NEEDED
Status: CANCELLED | OUTPATIENT
Start: 2024-05-26

## 2024-05-31 ENCOUNTER — INFUSION (OUTPATIENT)
Dept: INFUSION THERAPY | Facility: HOSPITAL | Age: 64
End: 2024-05-31
Attending: INTERNAL MEDICINE
Payer: MEDICARE

## 2024-05-31 VITALS
HEART RATE: 61 BPM | HEIGHT: 70 IN | BODY MASS INDEX: 34.84 KG/M2 | TEMPERATURE: 98 F | SYSTOLIC BLOOD PRESSURE: 137 MMHG | DIASTOLIC BLOOD PRESSURE: 86 MMHG | WEIGHT: 243.38 LBS | OXYGEN SATURATION: 99 %

## 2024-05-31 DIAGNOSIS — M06.09 RHEUMATOID ARTHRITIS OF MULTIPLE SITES WITHOUT RHEUMATOID FACTOR: Primary | ICD-10-CM

## 2024-05-31 PROCEDURE — 63600175 PHARM REV CODE 636 W HCPCS: Mod: JZ,JG

## 2024-05-31 PROCEDURE — 96372 THER/PROPH/DIAG INJ SC/IM: CPT

## 2024-05-31 RX ORDER — DIPHENHYDRAMINE HYDROCHLORIDE 50 MG/ML
12.5 INJECTION INTRAMUSCULAR; INTRAVENOUS ONCE AS NEEDED
OUTPATIENT
Start: 2024-06-28

## 2024-05-31 RX ORDER — ACETAMINOPHEN 500 MG
500 TABLET ORAL ONCE AS NEEDED
OUTPATIENT
Start: 2024-06-28

## 2024-05-31 RX ADMIN — CERTOLIZUMAB PEGOL 400 MG: 200 INJECTION, SOLUTION SUBCUTANEOUS at 09:05

## 2024-06-03 ENCOUNTER — TELEPHONE (OUTPATIENT)
Dept: GASTROENTEROLOGY | Facility: CLINIC | Age: 64
End: 2024-06-03
Payer: MEDICARE

## 2024-06-03 NOTE — TELEPHONE ENCOUNTER
Returned patient call and his wife's call. Let both of them know we would need a referral from his pcp. Patient has never been seen by Dr. Griggs or Dr. Ceja before. 6/3/24 LRA     ----- Message from Su Allison sent at 6/3/2024  1:14 PM CDT -----  Patient is calling in regards to will need to schedule colonoscopy procedure please call them back at [phone] (home) [phone] (work)

## 2024-06-28 ENCOUNTER — INFUSION (OUTPATIENT)
Dept: INFUSION THERAPY | Facility: HOSPITAL | Age: 64
End: 2024-06-28
Attending: INTERNAL MEDICINE
Payer: MEDICARE

## 2024-06-28 VITALS
HEIGHT: 70 IN | SYSTOLIC BLOOD PRESSURE: 151 MMHG | RESPIRATION RATE: 18 BRPM | HEART RATE: 55 BPM | DIASTOLIC BLOOD PRESSURE: 81 MMHG | BODY MASS INDEX: 35.36 KG/M2 | WEIGHT: 247 LBS

## 2024-06-28 DIAGNOSIS — M06.09 RHEUMATOID ARTHRITIS OF MULTIPLE SITES WITHOUT RHEUMATOID FACTOR: Primary | ICD-10-CM

## 2024-06-28 PROCEDURE — 96372 THER/PROPH/DIAG INJ SC/IM: CPT

## 2024-06-28 RX ORDER — DIPHENHYDRAMINE HYDROCHLORIDE 50 MG/ML
12.5 INJECTION INTRAMUSCULAR; INTRAVENOUS ONCE AS NEEDED
OUTPATIENT
Start: 2024-07-26

## 2024-06-28 RX ORDER — DIPHENHYDRAMINE HYDROCHLORIDE 50 MG/ML
12.5 INJECTION INTRAMUSCULAR; INTRAVENOUS ONCE AS NEEDED
Status: DISCONTINUED | OUTPATIENT
Start: 2024-06-28 | End: 2024-06-28 | Stop reason: HOSPADM

## 2024-06-28 RX ORDER — ACETAMINOPHEN 500 MG
500 TABLET ORAL ONCE AS NEEDED
Status: DISCONTINUED | OUTPATIENT
Start: 2024-06-28 | End: 2024-06-28 | Stop reason: HOSPADM

## 2024-06-28 RX ORDER — ACETAMINOPHEN 500 MG
500 TABLET ORAL ONCE AS NEEDED
OUTPATIENT
Start: 2024-07-26

## 2024-08-23 ENCOUNTER — INFUSION (OUTPATIENT)
Dept: INFUSION THERAPY | Facility: HOSPITAL | Age: 64
End: 2024-08-23
Attending: INTERNAL MEDICINE
Payer: MEDICARE

## 2024-08-23 DIAGNOSIS — M06.09 RHEUMATOID ARTHRITIS OF MULTIPLE SITES WITHOUT RHEUMATOID FACTOR: Primary | ICD-10-CM

## 2024-08-23 PROCEDURE — 96372 THER/PROPH/DIAG INJ SC/IM: CPT

## 2024-08-23 RX ORDER — ACETAMINOPHEN 500 MG
500 TABLET ORAL ONCE AS NEEDED
OUTPATIENT
Start: 2024-09-20

## 2024-08-23 RX ORDER — ACETAMINOPHEN 500 MG
500 TABLET ORAL ONCE AS NEEDED
Status: DISCONTINUED | OUTPATIENT
Start: 2024-08-23 | End: 2024-08-23 | Stop reason: HOSPADM

## 2024-08-23 RX ORDER — DIPHENHYDRAMINE HYDROCHLORIDE 50 MG/ML
12.5 INJECTION INTRAMUSCULAR; INTRAVENOUS ONCE AS NEEDED
OUTPATIENT
Start: 2024-09-20

## 2024-08-23 RX ORDER — DIPHENHYDRAMINE HYDROCHLORIDE 50 MG/ML
12.5 INJECTION INTRAMUSCULAR; INTRAVENOUS ONCE AS NEEDED
Status: DISCONTINUED | OUTPATIENT
Start: 2024-08-23 | End: 2024-08-23 | Stop reason: HOSPADM

## 2024-08-28 ENCOUNTER — TELEPHONE (OUTPATIENT)
Dept: GASTROENTEROLOGY | Facility: CLINIC | Age: 64
End: 2024-08-28
Payer: MEDICARE

## 2024-08-28 DIAGNOSIS — Z86.010 HX OF COLONIC POLYPS: Primary | ICD-10-CM

## 2024-08-28 NOTE — TELEPHONE ENCOUNTER
----- Message from Maria M Allen MA sent at 8/28/2024 12:42 PM CDT -----  Contact: Leigh (Spouse)    ----- Message -----  From: Yolis Tarango  Sent: 8/28/2024  10:09 AM CDT  To: [facility] Staff    Patient is requesting a call back regarding asking if referral from his doctor was received to schedule. Please call back at [phone]

## 2024-08-28 NOTE — TELEPHONE ENCOUNTER
----- Message from Catirna Hdez LPN sent at 8/28/2024 10:23 AM CDT -----    ----- Message -----  From: Brandi Carlton  Sent: 8/27/2024  10:38 AM CDT  To: [facility] jose/dr arrington calling for status referral sent 8/21/2024 and she can be reached at [phone] and fax number [phone]

## 2024-09-12 ENCOUNTER — OFFICE VISIT (OUTPATIENT)
Dept: GASTROENTEROLOGY | Facility: CLINIC | Age: 64
End: 2024-09-12
Payer: MEDICARE

## 2024-09-12 VITALS
BODY MASS INDEX: 34.79 KG/M2 | WEIGHT: 243 LBS | DIASTOLIC BLOOD PRESSURE: 82 MMHG | SYSTOLIC BLOOD PRESSURE: 137 MMHG | HEIGHT: 70 IN | HEART RATE: 51 BPM | OXYGEN SATURATION: 97 %

## 2024-09-12 DIAGNOSIS — Z12.11 SCREENING FOR COLON CANCER: Primary | ICD-10-CM

## 2024-09-12 PROCEDURE — 99499 UNLISTED E&M SERVICE: CPT | Mod: S$GLB,,,

## 2024-09-12 RX ORDER — MAGNESIUM 200 MG
TABLET ORAL ONCE
COMMUNITY

## 2024-09-12 RX ORDER — SOD SULF/POT CHLORIDE/MAG SULF 1.479 G
TABLET ORAL
Qty: 24 TABLET | Refills: 0 | Status: SHIPPED | OUTPATIENT
Start: 2024-09-12

## 2024-09-12 RX ORDER — CERTOLIZUMAB PEGOL 200 MG/ML
INJECTION, SOLUTION SUBCUTANEOUS
COMMUNITY
Start: 2024-02-23

## 2024-09-12 NOTE — PATIENT INSTRUCTIONS
Schedule colonoscopy with Dr. Griggs.     Please notify my office if you have not been contacted within two weeks after any procedures, submitting any samples (biopsies, blood, stool, urine, etc.) or after any imaging (X-ray, CT, MRI, etc.).

## 2024-09-12 NOTE — LETTER
September 12, 2024        Reynaldo Valdovinos MD  [address]  [address]  [place] [address]             [facility]  [address] [address]  Phone: [phone]  Fax: [phone]   Patient: Saurabh Ann   MR Number: [id number]   YOB: 1960   Date of Visit: 9/12/2024       Dear Dr. Valdovinos:    Thank you for referring Saurabh Ann to me for evaluation. Attached you will find relevant portions of my assessment and plan of care.    If you have questions, please do not hesitate to call me. I look forward to following Saurabh Ann along with you.    Sincerely,      Therese Gonzalez, NP            CC  No Recipients    Enclosure
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
## 2024-09-20 ENCOUNTER — INFUSION (OUTPATIENT)
Dept: INFUSION THERAPY | Facility: HOSPITAL | Age: 64
End: 2024-09-20
Attending: INTERNAL MEDICINE
Payer: MEDICARE

## 2024-09-20 VITALS
DIASTOLIC BLOOD PRESSURE: 84 MMHG | OXYGEN SATURATION: 97 % | WEIGHT: 241.63 LBS | BODY MASS INDEX: 34.59 KG/M2 | SYSTOLIC BLOOD PRESSURE: 135 MMHG | HEIGHT: 70 IN | HEART RATE: 62 BPM | TEMPERATURE: 98 F

## 2024-09-20 DIAGNOSIS — M06.09 RHEUMATOID ARTHRITIS OF MULTIPLE SITES WITHOUT RHEUMATOID FACTOR: Primary | ICD-10-CM

## 2024-09-20 PROCEDURE — 96372 THER/PROPH/DIAG INJ SC/IM: CPT

## 2024-09-20 PROCEDURE — 63600175 PHARM REV CODE 636 W HCPCS: Mod: JZ,JG

## 2024-09-20 RX ORDER — ACETAMINOPHEN 500 MG
500 TABLET ORAL ONCE AS NEEDED
OUTPATIENT
Start: 2024-10-18

## 2024-09-20 RX ORDER — DIPHENHYDRAMINE HYDROCHLORIDE 50 MG/ML
12.5 INJECTION INTRAMUSCULAR; INTRAVENOUS ONCE AS NEEDED
OUTPATIENT
Start: 2024-10-18

## 2024-09-20 RX ADMIN — CERTOLIZUMAB PEGOL 400 MG: 200 INJECTION, SOLUTION SUBCUTANEOUS at 08:09

## 2024-10-18 ENCOUNTER — INFUSION (OUTPATIENT)
Dept: INFUSION THERAPY | Facility: HOSPITAL | Age: 64
End: 2024-10-18
Attending: INTERNAL MEDICINE
Payer: MEDICARE

## 2024-10-18 VITALS
HEIGHT: 70 IN | HEART RATE: 61 BPM | DIASTOLIC BLOOD PRESSURE: 80 MMHG | TEMPERATURE: 98 F | WEIGHT: 242.81 LBS | BODY MASS INDEX: 34.76 KG/M2 | OXYGEN SATURATION: 97 % | RESPIRATION RATE: 20 BRPM | SYSTOLIC BLOOD PRESSURE: 142 MMHG

## 2024-10-18 DIAGNOSIS — M06.09 RHEUMATOID ARTHRITIS OF MULTIPLE SITES WITHOUT RHEUMATOID FACTOR: Primary | ICD-10-CM

## 2024-10-18 PROCEDURE — 96372 THER/PROPH/DIAG INJ SC/IM: CPT

## 2024-10-18 PROCEDURE — 63600175 PHARM REV CODE 636 W HCPCS: Mod: JZ,JG

## 2024-10-18 RX ORDER — DIPHENHYDRAMINE HYDROCHLORIDE 50 MG/ML
12.5 INJECTION INTRAMUSCULAR; INTRAVENOUS ONCE AS NEEDED
OUTPATIENT
Start: 2024-10-18

## 2024-10-18 RX ORDER — DICLOFENAC SODIUM 75 MG/1
75 TABLET, DELAYED RELEASE ORAL 2 TIMES DAILY
COMMUNITY
Start: 2024-10-17

## 2024-10-18 RX ORDER — ACETAMINOPHEN 500 MG
500 TABLET ORAL ONCE AS NEEDED
OUTPATIENT
Start: 2024-10-18

## 2024-10-18 RX ADMIN — CERTOLIZUMAB PEGOL 400 MG: 200 INJECTION, SOLUTION SUBCUTANEOUS at 09:10

## 2024-10-28 ENCOUNTER — PATIENT MESSAGE (OUTPATIENT)
Dept: GASTROENTEROLOGY | Facility: CLINIC | Age: 64
End: 2024-10-28
Payer: MEDICARE

## 2024-11-11 RX ORDER — ACETAMINOPHEN 500 MG
500 TABLET ORAL ONCE AS NEEDED
Status: CANCELLED | OUTPATIENT
Start: 2024-11-15

## 2024-11-11 RX ORDER — DIPHENHYDRAMINE HYDROCHLORIDE 50 MG/ML
12.5 INJECTION INTRAMUSCULAR; INTRAVENOUS ONCE AS NEEDED
Status: CANCELLED | OUTPATIENT
Start: 2024-11-15

## 2024-11-15 ENCOUNTER — INFUSION (OUTPATIENT)
Dept: INFUSION THERAPY | Facility: HOSPITAL | Age: 64
End: 2024-11-15
Attending: INTERNAL MEDICINE
Payer: MEDICARE

## 2024-11-15 VITALS
HEART RATE: 56 BPM | OXYGEN SATURATION: 98 % | RESPIRATION RATE: 20 BRPM | HEIGHT: 71 IN | WEIGHT: 244 LBS | DIASTOLIC BLOOD PRESSURE: 80 MMHG | BODY MASS INDEX: 34.16 KG/M2 | SYSTOLIC BLOOD PRESSURE: 142 MMHG | TEMPERATURE: 98 F

## 2024-11-15 DIAGNOSIS — M06.09 RHEUMATOID ARTHRITIS OF MULTIPLE SITES WITHOUT RHEUMATOID FACTOR: Primary | ICD-10-CM

## 2024-11-15 PROCEDURE — 96372 THER/PROPH/DIAG INJ SC/IM: CPT

## 2024-11-15 PROCEDURE — 63600175 PHARM REV CODE 636 W HCPCS: Mod: JZ,JG

## 2024-11-15 RX ORDER — ACETAMINOPHEN 500 MG
500 TABLET ORAL ONCE AS NEEDED
OUTPATIENT
Start: 2024-12-13

## 2024-11-15 RX ORDER — DIPHENHYDRAMINE HYDROCHLORIDE 50 MG/ML
12.5 INJECTION INTRAMUSCULAR; INTRAVENOUS ONCE AS NEEDED
OUTPATIENT
Start: 2024-12-13

## 2024-11-15 RX ADMIN — CERTOLIZUMAB PEGOL 400 MG: 200 INJECTION, SOLUTION SUBCUTANEOUS at 08:11

## 2024-12-13 ENCOUNTER — INFUSION (OUTPATIENT)
Dept: INFUSION THERAPY | Facility: HOSPITAL | Age: 64
End: 2024-12-13
Attending: INTERNAL MEDICINE
Payer: MEDICARE

## 2024-12-13 VITALS
HEIGHT: 71 IN | OXYGEN SATURATION: 97 % | DIASTOLIC BLOOD PRESSURE: 80 MMHG | WEIGHT: 249 LBS | RESPIRATION RATE: 18 BRPM | HEART RATE: 59 BPM | SYSTOLIC BLOOD PRESSURE: 129 MMHG | BODY MASS INDEX: 34.86 KG/M2 | TEMPERATURE: 98 F

## 2024-12-13 DIAGNOSIS — M06.09 RHEUMATOID ARTHRITIS OF MULTIPLE SITES WITHOUT RHEUMATOID FACTOR: Primary | ICD-10-CM

## 2024-12-13 PROCEDURE — 63600175 PHARM REV CODE 636 W HCPCS: Mod: JZ,JG

## 2024-12-13 PROCEDURE — 96372 THER/PROPH/DIAG INJ SC/IM: CPT

## 2024-12-13 RX ORDER — DIPHENHYDRAMINE HYDROCHLORIDE 50 MG/ML
12.5 INJECTION INTRAMUSCULAR; INTRAVENOUS ONCE AS NEEDED
OUTPATIENT
Start: 2025-01-10

## 2024-12-13 RX ORDER — ACETAMINOPHEN 500 MG
500 TABLET ORAL ONCE AS NEEDED
Status: DISCONTINUED | OUTPATIENT
Start: 2024-12-13 | End: 2024-12-13 | Stop reason: HOSPADM

## 2024-12-13 RX ORDER — ACETAMINOPHEN 500 MG
500 TABLET ORAL ONCE AS NEEDED
OUTPATIENT
Start: 2025-01-10

## 2024-12-13 RX ORDER — DIPHENHYDRAMINE HYDROCHLORIDE 50 MG/ML
12.5 INJECTION INTRAMUSCULAR; INTRAVENOUS ONCE AS NEEDED
Status: DISCONTINUED | OUTPATIENT
Start: 2024-12-13 | End: 2024-12-13 | Stop reason: HOSPADM

## 2024-12-13 RX ADMIN — CERTOLIZUMAB PEGOL 400 MG: 200 INJECTION, SOLUTION SUBCUTANEOUS at 08:12

## 2025-01-13 ENCOUNTER — INFUSION (OUTPATIENT)
Dept: INFUSION THERAPY | Facility: HOSPITAL | Age: 65
End: 2025-01-13
Attending: INTERNAL MEDICINE
Payer: MEDICARE

## 2025-01-13 VITALS
TEMPERATURE: 98 F | RESPIRATION RATE: 18 BRPM | SYSTOLIC BLOOD PRESSURE: 142 MMHG | HEIGHT: 71 IN | HEART RATE: 62 BPM | DIASTOLIC BLOOD PRESSURE: 87 MMHG | WEIGHT: 252 LBS | OXYGEN SATURATION: 98 % | BODY MASS INDEX: 35.28 KG/M2

## 2025-01-13 DIAGNOSIS — M06.09 RHEUMATOID ARTHRITIS OF MULTIPLE SITES WITHOUT RHEUMATOID FACTOR: Primary | ICD-10-CM

## 2025-01-13 PROCEDURE — 96372 THER/PROPH/DIAG INJ SC/IM: CPT

## 2025-01-13 PROCEDURE — 63600175 PHARM REV CODE 636 W HCPCS: Mod: JZ,TB

## 2025-01-13 RX ORDER — ACETAMINOPHEN 500 MG
500 TABLET ORAL ONCE AS NEEDED
OUTPATIENT
Start: 2025-02-03

## 2025-01-13 RX ORDER — DIPHENHYDRAMINE HYDROCHLORIDE 50 MG/ML
12.5 INJECTION INTRAMUSCULAR; INTRAVENOUS ONCE AS NEEDED
OUTPATIENT
Start: 2025-02-03

## 2025-01-13 RX ORDER — ACETAMINOPHEN 500 MG
500 TABLET ORAL ONCE AS NEEDED
Status: DISCONTINUED | OUTPATIENT
Start: 2025-01-13 | End: 2025-01-13 | Stop reason: HOSPADM

## 2025-01-13 RX ORDER — DIPHENHYDRAMINE HYDROCHLORIDE 50 MG/ML
12.5 INJECTION INTRAMUSCULAR; INTRAVENOUS ONCE AS NEEDED
Status: DISCONTINUED | OUTPATIENT
Start: 2025-01-13 | End: 2025-01-13 | Stop reason: HOSPADM

## 2025-01-13 RX ADMIN — CERTOLIZUMAB PEGOL 400 MG: 200 INJECTION, SOLUTION SUBCUTANEOUS at 10:01

## 2025-02-07 ENCOUNTER — INFUSION (OUTPATIENT)
Dept: INFUSION THERAPY | Facility: HOSPITAL | Age: 65
End: 2025-02-07
Attending: INTERNAL MEDICINE
Payer: MEDICARE

## 2025-02-07 VITALS
HEART RATE: 57 BPM | DIASTOLIC BLOOD PRESSURE: 72 MMHG | SYSTOLIC BLOOD PRESSURE: 148 MMHG | OXYGEN SATURATION: 98 % | TEMPERATURE: 98 F | WEIGHT: 246.38 LBS | BODY MASS INDEX: 34.49 KG/M2 | HEIGHT: 71 IN

## 2025-02-07 DIAGNOSIS — M06.09 RHEUMATOID ARTHRITIS OF MULTIPLE SITES WITHOUT RHEUMATOID FACTOR: Primary | ICD-10-CM

## 2025-02-07 PROCEDURE — 63600175 PHARM REV CODE 636 W HCPCS: Mod: JZ,TB

## 2025-02-07 PROCEDURE — 96372 THER/PROPH/DIAG INJ SC/IM: CPT

## 2025-02-07 RX ORDER — DIPHENHYDRAMINE HYDROCHLORIDE 50 MG/ML
12.5 INJECTION INTRAMUSCULAR; INTRAVENOUS ONCE AS NEEDED
OUTPATIENT
Start: 2025-02-10

## 2025-02-07 RX ORDER — ACETAMINOPHEN 500 MG
500 TABLET ORAL ONCE AS NEEDED
OUTPATIENT
Start: 2025-02-10

## 2025-02-07 RX ADMIN — CERTOLIZUMAB PEGOL 400 MG: 200 INJECTION, SOLUTION SUBCUTANEOUS at 08:02

## 2025-02-11 ENCOUNTER — TELEPHONE (OUTPATIENT)
Dept: GASTROENTEROLOGY | Facility: CLINIC | Age: 65
End: 2025-02-11
Payer: MEDICARE

## 2025-02-11 DIAGNOSIS — Z12.11 SCREENING FOR COLON CANCER: Primary | ICD-10-CM
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
## 2025-02-11 NOTE — TELEPHONE ENCOUNTER
Called and left a detailed message that I was calling as a courtesy regarding up coming Colon with NBP on 6/14/24, Friday and wanted to verify that he has his paper prep instructions and meds. Pt stated he has both.  I also mentioned that [facility] will call the day before (MON) with the arrival time, GI Lab is located on the third floor, and to pre-register anytime this week. [email or web address]

## 2025-02-18 ENCOUNTER — OUTSIDE PLACE OF SERVICE (OUTPATIENT)
Dept: GASTROENTEROLOGY | Facility: CLINIC | Age: 65
End: 2025-02-18

## 2025-02-18 LAB — CRC RECOMMENDATION EXT: NORMAL

## 2025-02-23 ENCOUNTER — RESULTS FOLLOW-UP (OUTPATIENT)
Dept: GASTROENTEROLOGY | Facility: CLINIC | Age: 65
End: 2025-02-23
Payer: MEDICARE

## 2025-02-23 NOTE — TELEPHONE ENCOUNTER
1 benign, repeat colonoscopy in 5 years.     Notify patient his colon polyp was benign. Repeat colonoscopy in 5 years. Confirm recall tab in appointment desk is up-to-date (update if needed). I had sent the patient to Dr. Leon. Have the patient send me cardiology's results once completed.  NBP

## 2025-02-24 NOTE — TELEPHONE ENCOUNTER
Updated recall to reflect 5 years.  Spoke with patient and gave results and recommendations per NBP. Patient informed of repeat colonoscopy in 5 years. Patient will go for ECHO on March 21st then see Dr. Leon for an appointment on April 4th. Patient was informed that Dr. Griggs wanted the cardiology results once completed. Patient verbalized understanding of this information. -LISETH, LPN.

## 2025-03-07 ENCOUNTER — INFUSION (OUTPATIENT)
Facility: HOSPITAL | Age: 65
End: 2025-03-07
Attending: INTERNAL MEDICINE
Payer: MEDICARE

## 2025-03-07 VITALS
TEMPERATURE: 98 F | WEIGHT: 249.38 LBS | OXYGEN SATURATION: 99 % | HEART RATE: 59 BPM | BODY MASS INDEX: 34.91 KG/M2 | HEIGHT: 71 IN | DIASTOLIC BLOOD PRESSURE: 80 MMHG | SYSTOLIC BLOOD PRESSURE: 132 MMHG

## 2025-03-07 DIAGNOSIS — M06.09 RHEUMATOID ARTHRITIS OF MULTIPLE SITES WITHOUT RHEUMATOID FACTOR: Primary | ICD-10-CM

## 2025-03-07 PROCEDURE — 96372 THER/PROPH/DIAG INJ SC/IM: CPT

## 2025-03-07 PROCEDURE — 63600175 PHARM REV CODE 636 W HCPCS: Mod: JZ,TB

## 2025-03-07 RX ORDER — DIPHENHYDRAMINE HYDROCHLORIDE 50 MG/ML
12.5 INJECTION INTRAMUSCULAR; INTRAVENOUS ONCE AS NEEDED
OUTPATIENT
Start: 2025-03-14

## 2025-03-07 RX ORDER — ACETAMINOPHEN 500 MG
500 TABLET ORAL ONCE AS NEEDED
OUTPATIENT
Start: 2025-03-14

## 2025-03-07 RX ADMIN — CERTOLIZUMAB PEGOL 400 MG: 200 INJECTION, SOLUTION SUBCUTANEOUS at 08:03

## 2025-04-04 ENCOUNTER — INFUSION (OUTPATIENT)
Facility: HOSPITAL | Age: 65
End: 2025-04-04
Attending: INTERNAL MEDICINE
Payer: MEDICARE

## 2025-04-04 VITALS
WEIGHT: 243.63 LBS | HEIGHT: 71 IN | BODY MASS INDEX: 34.11 KG/M2 | TEMPERATURE: 97 F | SYSTOLIC BLOOD PRESSURE: 137 MMHG | DIASTOLIC BLOOD PRESSURE: 85 MMHG | OXYGEN SATURATION: 100 % | HEART RATE: 54 BPM

## 2025-04-04 DIAGNOSIS — M06.09 RHEUMATOID ARTHRITIS OF MULTIPLE SITES WITHOUT RHEUMATOID FACTOR: Primary | ICD-10-CM

## 2025-04-04 PROCEDURE — 63600175 PHARM REV CODE 636 W HCPCS: Mod: JZ,TB

## 2025-04-04 PROCEDURE — 96372 THER/PROPH/DIAG INJ SC/IM: CPT

## 2025-04-04 RX ORDER — ACETAMINOPHEN 500 MG
500 TABLET ORAL ONCE AS NEEDED
OUTPATIENT
Start: 2025-04-11

## 2025-04-04 RX ORDER — DIPHENHYDRAMINE HYDROCHLORIDE 50 MG/ML
12.5 INJECTION, SOLUTION INTRAMUSCULAR; INTRAVENOUS ONCE AS NEEDED
OUTPATIENT
Start: 2025-04-11

## 2025-04-04 RX ADMIN — CERTOLIZUMAB PEGOL 400 MG: 200 INJECTION, SOLUTION SUBCUTANEOUS at 08:04

## 2025-04-09 ENCOUNTER — DOCUMENTATION ONLY (OUTPATIENT)
Dept: GASTROENTEROLOGY | Facility: CLINIC | Age: 65
End: 2025-04-09
Payer: MEDICARE

## 2025-05-02 ENCOUNTER — INFUSION (OUTPATIENT)
Facility: HOSPITAL | Age: 65
End: 2025-05-02
Attending: INTERNAL MEDICINE
Payer: MEDICARE

## 2025-05-02 VITALS
OXYGEN SATURATION: 98 % | SYSTOLIC BLOOD PRESSURE: 125 MMHG | WEIGHT: 242 LBS | DIASTOLIC BLOOD PRESSURE: 79 MMHG | HEART RATE: 54 BPM | BODY MASS INDEX: 34.65 KG/M2 | HEIGHT: 70 IN | TEMPERATURE: 98 F

## 2025-05-02 DIAGNOSIS — M06.09 RHEUMATOID ARTHRITIS OF MULTIPLE SITES WITHOUT RHEUMATOID FACTOR: Primary | ICD-10-CM

## 2025-05-02 PROCEDURE — 96372 THER/PROPH/DIAG INJ SC/IM: CPT

## 2025-05-30 ENCOUNTER — INFUSION (OUTPATIENT)
Facility: HOSPITAL | Age: 65
End: 2025-05-30
Attending: INTERNAL MEDICINE
Payer: MEDICARE

## 2025-05-30 VITALS
HEIGHT: 70 IN | DIASTOLIC BLOOD PRESSURE: 72 MMHG | TEMPERATURE: 98 F | SYSTOLIC BLOOD PRESSURE: 120 MMHG | WEIGHT: 242 LBS | BODY MASS INDEX: 34.65 KG/M2 | HEART RATE: 60 BPM | OXYGEN SATURATION: 98 % | RESPIRATION RATE: 16 BRPM

## 2025-05-30 DIAGNOSIS — M06.09 RHEUMATOID ARTHRITIS OF MULTIPLE SITES WITHOUT RHEUMATOID FACTOR: Primary | ICD-10-CM

## 2025-05-30 PROCEDURE — 96372 THER/PROPH/DIAG INJ SC/IM: CPT

## 2025-06-23 ENCOUNTER — TELEPHONE (OUTPATIENT)
Dept: GASTROENTEROLOGY | Facility: CLINIC | Age: 65
End: 2025-06-23
Payer: MEDICARE

## 2025-06-24 NOTE — TELEPHONE ENCOUNTER
Patient was referred to Dr. Leon by me after his colonoscopy. Get last OV note from his office.  NBP

## 2025-06-25 NOTE — TELEPHONE ENCOUNTER
6-24-25..NAHID faxed to [phone] [facility] req last OV note for 4-4-25.. conf received... LDN

## 2025-06-30 ENCOUNTER — INFUSION (OUTPATIENT)
Facility: HOSPITAL | Age: 65
End: 2025-06-30
Attending: INTERNAL MEDICINE
Payer: MEDICARE

## 2025-06-30 VITALS
HEIGHT: 70 IN | SYSTOLIC BLOOD PRESSURE: 140 MMHG | HEART RATE: 50 BPM | BODY MASS INDEX: 35.07 KG/M2 | RESPIRATION RATE: 18 BRPM | TEMPERATURE: 98 F | DIASTOLIC BLOOD PRESSURE: 87 MMHG | OXYGEN SATURATION: 99 % | WEIGHT: 245 LBS

## 2025-06-30 DIAGNOSIS — M06.09 RHEUMATOID ARTHRITIS OF MULTIPLE SITES WITHOUT RHEUMATOID FACTOR: Primary | ICD-10-CM

## 2025-06-30 PROCEDURE — 96372 THER/PROPH/DIAG INJ SC/IM: CPT

## 2025-06-30 PROCEDURE — 63600175 PHARM REV CODE 636 W HCPCS: Mod: JZ,TB | Performed by: INTERNAL MEDICINE

## 2025-06-30 RX ADMIN — CERTOLIZUMAB PEGOL 400 MG: 200 INJECTION, SOLUTION SUBCUTANEOUS at 08:06

## 2025-07-04 ENCOUNTER — RESULTS FOLLOW-UP (OUTPATIENT)
Dept: GASTROENTEROLOGY | Facility: CLINIC | Age: 65
End: 2025-07-04
Payer: MEDICARE

## 2025-08-01 ENCOUNTER — INFUSION (OUTPATIENT)
Facility: HOSPITAL | Age: 65
End: 2025-08-01
Attending: INTERNAL MEDICINE
Payer: MEDICARE

## 2025-08-01 VITALS
SYSTOLIC BLOOD PRESSURE: 148 MMHG | HEART RATE: 55 BPM | HEIGHT: 70 IN | TEMPERATURE: 98 F | BODY MASS INDEX: 34.93 KG/M2 | DIASTOLIC BLOOD PRESSURE: 81 MMHG | WEIGHT: 244 LBS | OXYGEN SATURATION: 98 %

## 2025-08-01 DIAGNOSIS — M06.09 RHEUMATOID ARTHRITIS OF MULTIPLE SITES WITHOUT RHEUMATOID FACTOR: Primary | ICD-10-CM

## 2025-08-01 PROCEDURE — 96372 THER/PROPH/DIAG INJ SC/IM: CPT

## 2025-08-29 ENCOUNTER — INFUSION (OUTPATIENT)
Facility: HOSPITAL | Age: 65
End: 2025-08-29
Attending: INTERNAL MEDICINE
Payer: MEDICARE

## 2025-08-29 VITALS
RESPIRATION RATE: 18 BRPM | TEMPERATURE: 98 F | OXYGEN SATURATION: 99 % | WEIGHT: 247 LBS | SYSTOLIC BLOOD PRESSURE: 154 MMHG | HEIGHT: 70 IN | BODY MASS INDEX: 35.36 KG/M2 | HEART RATE: 65 BPM | DIASTOLIC BLOOD PRESSURE: 90 MMHG

## 2025-08-29 DIAGNOSIS — M06.09 RHEUMATOID ARTHRITIS OF MULTIPLE SITES WITHOUT RHEUMATOID FACTOR: Primary | ICD-10-CM

## 2025-08-29 PROCEDURE — 96372 THER/PROPH/DIAG INJ SC/IM: CPT
